# Patient Record
Sex: FEMALE | Race: WHITE | Employment: OTHER | URBAN - METROPOLITAN AREA
[De-identification: names, ages, dates, MRNs, and addresses within clinical notes are randomized per-mention and may not be internally consistent; named-entity substitution may affect disease eponyms.]

---

## 2017-06-13 ENCOUNTER — TELEPHONE (OUTPATIENT)
Dept: FAMILY MEDICINE | Facility: CLINIC | Age: 57
End: 2017-06-13

## 2017-06-13 NOTE — TELEPHONE ENCOUNTER
Dr. Solis please review [facility] Letter placed in basket.    Looks like you have not seen this patient for over 3 years!    Is this appropriate? Please advise    Diamond MOONEY RN, BSN, PHN  Gleason Flex RN

## 2017-06-13 NOTE — TELEPHONE ENCOUNTER
Reason for Call:  Form, our goal is to have forms completed with 72 hours, however, some forms may require a visit or additional information.    Type of letter, form or note:  medical    Who is the form from?: [facility] (if other please explain)    Where did the form come from: form was mailed in    What clinic location was the form placed at?: [facility]     Where the form was placed: [facility]    What number is listed as a contact on the form?:        Additional comments: Fax when comple to fax # [phone]    Call taken on 6/13/2017 at 11:03 AM by PETAR CHRISTINE

## 2017-09-30 ENCOUNTER — HEALTH MAINTENANCE LETTER (OUTPATIENT)
Age: 57
End: 2017-09-30

## 2018-01-11 ENCOUNTER — TRANSFERRED RECORDS (OUTPATIENT)
Dept: HEALTH INFORMATION MANAGEMENT | Facility: CLINIC | Age: 58
End: 2018-01-11

## 2018-02-28 ENCOUNTER — RADIANT APPOINTMENT (OUTPATIENT)
Dept: GENERAL RADIOLOGY | Facility: CLINIC | Age: 58
End: 2018-02-28
Attending: NURSE PRACTITIONER
Payer: COMMERCIAL

## 2018-02-28 ENCOUNTER — OFFICE VISIT (OUTPATIENT)
Dept: FAMILY MEDICINE | Facility: CLINIC | Age: 58
End: 2018-02-28
Payer: COMMERCIAL

## 2018-02-28 VITALS
RESPIRATION RATE: 14 BRPM | DIASTOLIC BLOOD PRESSURE: 80 MMHG | HEIGHT: 68 IN | HEART RATE: 70 BPM | SYSTOLIC BLOOD PRESSURE: 126 MMHG | WEIGHT: 179 LBS | BODY MASS INDEX: 27.13 KG/M2 | TEMPERATURE: 98.5 F

## 2018-02-28 DIAGNOSIS — M25.551 PAIN IN RIGHT HIP: ICD-10-CM

## 2018-02-28 DIAGNOSIS — M79.651 PAIN OF RIGHT THIGH: Primary | ICD-10-CM

## 2018-02-28 DIAGNOSIS — M79.651 PAIN OF RIGHT THIGH: ICD-10-CM

## 2018-02-28 PROCEDURE — 73552 X-RAY EXAM OF FEMUR 2/>: CPT | Mod: RT

## 2018-02-28 PROCEDURE — 72170 X-RAY EXAM OF PELVIS: CPT | Mod: FY

## 2018-02-28 PROCEDURE — 99213 OFFICE O/P EST LOW 20 MIN: CPT | Performed by: NURSE PRACTITIONER

## 2018-02-28 RX ORDER — CAPECITABINE 500 MG/1
TABLET, FILM COATED ORAL
COMMUNITY
Start: 2018-02-06 | End: 2018-10-26

## 2018-02-28 ASSESSMENT — ANXIETY QUESTIONNAIRES
2. NOT BEING ABLE TO STOP OR CONTROL WORRYING: NOT AT ALL
GAD7 TOTAL SCORE: 0
3. WORRYING TOO MUCH ABOUT DIFFERENT THINGS: NOT AT ALL
1. FEELING NERVOUS, ANXIOUS, OR ON EDGE: NOT AT ALL
6. BECOMING EASILY ANNOYED OR IRRITABLE: NOT AT ALL
7. FEELING AFRAID AS IF SOMETHING AWFUL MIGHT HAPPEN: NOT AT ALL
5. BEING SO RESTLESS THAT IT IS HARD TO SIT STILL: NOT AT ALL

## 2018-02-28 ASSESSMENT — PATIENT HEALTH QUESTIONNAIRE - PHQ9: 5. POOR APPETITE OR OVEREATING: NOT AT ALL

## 2018-02-28 NOTE — MR AVS SNAPSHOT
"              After Visit Summary   2/28/2018    Darien Rivera    MRN: [id number]           Patient Information     Date Of Birth          1960        Visit Information        Provider Department      2/28/2018 9:40 AM Beryl Cesar APRN CNP [facility]        Today's Diagnoses     Pain of right thigh    -  1    Pain in right hip           Follow-ups after your visit        Follow-up notes from your care team     Return if symptoms worsen or fail to improve.      Who to contact     If you have questions or need follow up information about today's clinic visit or your schedule please contact [facility] directly at [phone].  Normal or non-critical lab and imaging results will be communicated to you by [facility], letter or phone within 4 business days after the clinic has received the results. If you do not hear from us within 7 days, please contact the clinic through [facility] or phone. If you have a critical or abnormal lab result, we will notify you by phone as soon as possible.  Submit refill requests through [employer] or call your pharmacy and they will forward the refill request to us. Please allow 3 business days for your refill to be completed.          Additional Information About Your Visit        MyChart Information     [employer] gives you secure access to your electronic health record. If you see a primary care provider, you can also send messages to your care team and make appointments. If you have questions, please call your primary care clinic.  If you do not have a primary care provider, please call [phone] and they will assist you.        Care EveryWhere ID     This is your Care EveryWhere ID. This could be used by other organizations to access your [facility] medical records  [id number]        Your Vitals Were     Pulse Temperature Respirations Height Last Period BMI (Body Mass Index)    70 98.5  F (36.9  C) (Oral) 14 5' 8\" (1.727 m) 11/15/2012 27.22 " kg/m2       Blood Pressure from Last 3 Encounters:   02/28/18 126/80   04/18/16 131/81   09/09/15 141/80    Weight from Last 3 Encounters:   02/28/18 179 lb (81.2 kg)   04/18/16 174 lb (78.9 kg)   09/09/15 171 lb 12.8 oz (77.9 kg)               Primary Care Provider Office Phone # Fax #    Sharon Emilee Solis [phone]       [id number]  [address]   [facility] [address]        Equal Access to Services     [facility]: Hadii aad ku hadasho Soomaali, waaxda luqadaha, qaybta kaalmada adeegyada, verona machado . So [facility] [phone].    ATENCIÓN: Si habla español, tiene a argueta disposición servicios gratuitos de asistencia lingüística. [facility] [phone].    We comply with applicable federal civil rights laws and [place] laws. We do not discriminate on the basis of race, color, national origin, age, disability, sex, sexual orientation, or gender identity.            Thank you!     Thank you for choosing [facility]  for your care. Our goal is always to provide you with excellent care. Hearing back from our patients is one way we can continue to improve our services. Please take a few minutes to complete the written survey that you may receive in the mail after your visit with us. Thank you!             Your Updated Medication List - Protect others around you: Learn how to safely use, store and throw away your medicines at www.disposemymeds.org.          This list is accurate as of 2/28/18 11:00 AM.  Always use your most recent med list.                   Brand Name Dispense Instructions for use Diagnosis    calcium 600/vitamin D 600-400 MG-UNIT per tablet   Generic drug:  calcium-vitamin D      Take 1 tablet by mouth 2 times daily        capecitabine 500 MG tablet CHEMO    XELODA          LORazepam 0.5 MG tablet    ATIVAN    30 tablet    Take 1 tablet (0.5 mg) by mouth every 4 hours as needed (Anxiety, Nausea/Vomiting or Sleep)    Personal history of  breast cancer, Bony metastasis (H), Cancer of breast (H)       XGEVA SC

## 2018-02-28 NOTE — PROGRESS NOTES
"HPI    SUBJECTIVE:   Darien Rivera is a 57 year old female who presents to clinic today for the following health issues:      Musculoskeletal problem/pain      Duration: started about 5 days ago     Description  Location: right thigh \"bone pain\"     Intensity:  moderate    Accompanying signs and symptoms: none    History  Previous similar problem: no-history of bone cancer   Previous evaluation:  none    Precipitating or alleviating factors:  Trauma or overuse: no   Aggravating factors include: certain movements. \"rolling over in bed\"     Therapies tried and outcome: NSAID - advil    Has follow up appointment on 3/6 at [place] with oncology.    Pain in R upper leg, rated 8/10 that is worse with movement or rolling over in bed.  No back pain.           Problem list and histories reviewed & adjusted, as indicated.  Additional history: as documented    Current Outpatient Prescriptions   Medication Sig Dispense Refill     capecitabine (XELODA) 500 MG tablet CHEMO        calcium-vitamin D (CALCIUM 600/VITAMIN D) 600-400 MG-UNIT per tablet Take 1 tablet by mouth 2 times daily        LORazepam (ATIVAN) 0.5 MG tablet Take 1 tablet (0.5 mg) by mouth every 4 hours as needed (Anxiety, Nausea/Vomiting or Sleep) (Patient not taking: Reported on 2/28/2018) 30 tablet 2     Allergies   Allergen Reactions     Erythromycin Nausea and Cramps     Keflex [Cephalexin Monohydrate] Nausea and Itching     Tegaderm Transparent Dressing (Informational Only) Rash       Reviewed and updated as needed this visit by clinical staff  Tobacco  Allergies  Problems  Med Hx  Soc Hx      Reviewed and updated as needed this visit by Provider         ROS:  CONSTITUTIONAL:NEGATIVE  for chills and fever   INTEGUMENTARY/SKIN: POSITIVE for see HPI  MUSCULOSKELETAL: NEGATIVE for significant arthralgias or myalgia    OBJECTIVE:     /80 (BP Location: Right arm, Cuff Size: Adult Regular)  Pulse 70  Temp 98.5  F (36.9  C) (Oral)  Resp 14  Ht 5' 8\" " (1.727 m)  Wt 179 lb (81.2 kg)  LMP 11/15/2012  BMI 27.22 kg/m2  Body mass index is 27.22 kg/(m^2).  GENERAL: alert and no distress  MS: no spinal or paraspinal tenderness, R hip with FROM though notes pain with flexion and adduction, no tenderness over R leg or hip, normal strength  SKIN: warm and dry; no suspicious lesions or rashes  PSYCH: mentation appears normal, affect normal/bright    XR: XR PELVIS 1/2 VW 2/28/2018 10:43 AM     HISTORY: Pain.     COMPARISON: None.         IMPRESSION: No evidence of acute fracture or malalignment. There is  faint patchy sclerosis throughout the pelvis and visualized bilateral  proximal femurs.     IMANI SERVIN MD  XR FEMUR RT 2 VW 2/28/2018 10:36 AM     HISTORY: Pain.     COMPARISON: None.         IMPRESSION: No evidence of acute fracture or malalignment. No bony  abnormalities appreciated.     IMANI SERVIN MD    ASSESSMENT/PLAN:   1. Pain of right thigh  Hx of cancer with bone mets.  New pain in R upper leg.  Will follow up with oncology at appt next week.   - XR Femur Right 2 Views; Future  - XR Pelvis 1/2 Views; Future    2. Pain in right hip  Hx of cancer with bone mets.  New pain in R upper leg. Will follow up with oncology at appt next week.  - XR Pelvis 1/2 Views; Future        NATALIE [facility]      Physical Exam

## 2018-03-01 ASSESSMENT — PATIENT HEALTH QUESTIONNAIRE - PHQ9: SUM OF ALL RESPONSES TO PHQ QUESTIONS 1-9: 1

## 2018-03-01 ASSESSMENT — ANXIETY QUESTIONNAIRES: GAD7 TOTAL SCORE: 0

## 2018-03-06 ENCOUNTER — TRANSFERRED RECORDS (OUTPATIENT)
Dept: HEALTH INFORMATION MANAGEMENT | Facility: CLINIC | Age: 58
End: 2018-03-06

## 2018-05-02 ENCOUNTER — TELEPHONE (OUTPATIENT)
Dept: FAMILY MEDICINE | Facility: CLINIC | Age: 58
End: 2018-05-02

## 2018-05-02 NOTE — TELEPHONE ENCOUNTER
Panel Management Review      Patient has the following on her problem list: None      Composite cancer screening  Chart review shows that this patient is due/due soon for the following Pap Smear, Colonoscopy and Fecal Colorectal (FIT)  Summary:    Patient is due/failing the following:   COLONOSCOPY, FIT and PAP    Action needed:   Patient needs office visit for physical with pap and colonoscopy or FIT test.    Type of outreach:    Phone, left message for patient to call back.     Questions for provider review:    None                                                                                                                                    Monae Juarez MA     Chart routed to Care Team .

## 2018-05-02 NOTE — TELEPHONE ENCOUNTER
Pt called, she will schedule via [employer] or call back.     Tab Contreras   05/02/18 2:47 PM

## 2018-05-04 ENCOUNTER — TRANSFERRED RECORDS (OUTPATIENT)
Dept: HEALTH INFORMATION MANAGEMENT | Facility: CLINIC | Age: 58
End: 2018-05-04

## 2018-06-14 ENCOUNTER — OFFICE VISIT (OUTPATIENT)
Dept: FAMILY MEDICINE | Facility: CLINIC | Age: 58
End: 2018-06-14
Payer: COMMERCIAL

## 2018-06-14 VITALS
RESPIRATION RATE: 16 BRPM | TEMPERATURE: 97.5 F | HEART RATE: 66 BPM | HEIGHT: 68 IN | WEIGHT: 171.3 LBS | SYSTOLIC BLOOD PRESSURE: 118 MMHG | BODY MASS INDEX: 25.96 KG/M2 | DIASTOLIC BLOOD PRESSURE: 74 MMHG

## 2018-06-14 DIAGNOSIS — Z12.11 SCREEN FOR COLON CANCER: ICD-10-CM

## 2018-06-14 DIAGNOSIS — C79.52 SECONDARY MALIGNANT NEOPLASM OF BONE AND BONE MARROW (H): ICD-10-CM

## 2018-06-14 DIAGNOSIS — Z11.59 NEED FOR HEPATITIS C SCREENING TEST: ICD-10-CM

## 2018-06-14 DIAGNOSIS — E78.1 HYPERTRIGLYCERIDEMIA: ICD-10-CM

## 2018-06-14 DIAGNOSIS — Z11.4 SCREENING FOR HIV (HUMAN IMMUNODEFICIENCY VIRUS): ICD-10-CM

## 2018-06-14 DIAGNOSIS — C50.912 MALIGNANT NEOPLASM OF LEFT BREAST IN FEMALE, ESTROGEN RECEPTOR POSITIVE, UNSPECIFIED SITE OF BREAST (H): ICD-10-CM

## 2018-06-14 DIAGNOSIS — Z12.4 SCREENING FOR MALIGNANT NEOPLASM OF CERVIX: ICD-10-CM

## 2018-06-14 DIAGNOSIS — C79.51 SECONDARY MALIGNANT NEOPLASM OF BONE AND BONE MARROW (H): ICD-10-CM

## 2018-06-14 DIAGNOSIS — Z00.00 ROUTINE GENERAL MEDICAL EXAMINATION AT A HEALTH CARE FACILITY: Primary | ICD-10-CM

## 2018-06-14 DIAGNOSIS — Z13.6 CARDIOVASCULAR SCREENING; LDL GOAL LESS THAN 160: ICD-10-CM

## 2018-06-14 DIAGNOSIS — Z17.0 MALIGNANT NEOPLASM OF LEFT BREAST IN FEMALE, ESTROGEN RECEPTOR POSITIVE, UNSPECIFIED SITE OF BREAST (H): ICD-10-CM

## 2018-06-14 LAB
ALBUMIN SERPL-MCNC: 4 G/DL (ref 3.4–5)
ALP SERPL-CCNC: 175 U/L (ref 40–150)
ALT SERPL W P-5'-P-CCNC: 49 U/L (ref 0–50)
ANION GAP SERPL CALCULATED.3IONS-SCNC: 10 MMOL/L (ref 3–14)
AST SERPL W P-5'-P-CCNC: 62 U/L (ref 0–45)
BILIRUB SERPL-MCNC: 0.7 MG/DL (ref 0.2–1.3)
BUN SERPL-MCNC: 13 MG/DL (ref 7–30)
CALCIUM SERPL-MCNC: 9.6 MG/DL (ref 8.5–10.1)
CHLORIDE SERPL-SCNC: 103 MMOL/L (ref 94–109)
CHOLEST SERPL-MCNC: 178 MG/DL
CO2 SERPL-SCNC: 25 MMOL/L (ref 20–32)
CREAT SERPL-MCNC: 0.86 MG/DL (ref 0.52–1.04)
ERYTHROCYTE [DISTWIDTH] IN BLOOD BY AUTOMATED COUNT: 13.8 % (ref 10–15)
GFR SERPL CREATININE-BSD FRML MDRD: 68 ML/MIN/1.7M2
GLUCOSE SERPL-MCNC: 106 MG/DL (ref 70–99)
HCT VFR BLD AUTO: 41.2 % (ref 35–47)
HDLC SERPL-MCNC: 67 MG/DL
HGB BLD-MCNC: 13.9 G/DL (ref 11.7–15.7)
LDLC SERPL CALC-MCNC: 96 MG/DL
MCH RBC QN AUTO: 35.5 PG (ref 26.5–33)
MCHC RBC AUTO-ENTMCNC: 33.7 G/DL (ref 31.5–36.5)
MCV RBC AUTO: 105 FL (ref 78–100)
NONHDLC SERPL-MCNC: 111 MG/DL
PLATELET # BLD AUTO: 259 10E9/L (ref 150–450)
POTASSIUM SERPL-SCNC: 4.5 MMOL/L (ref 3.4–5.3)
PROT SERPL-MCNC: 7.8 G/DL (ref 6.8–8.8)
RBC # BLD AUTO: 3.92 10E12/L (ref 3.8–5.2)
SODIUM SERPL-SCNC: 138 MMOL/L (ref 133–144)
TRIGL SERPL-MCNC: 74 MG/DL
TSH SERPL DL<=0.005 MIU/L-ACNC: 1.27 MU/L (ref 0.4–4)
WBC # BLD AUTO: 8.1 10E9/L (ref 4–11)

## 2018-06-14 PROCEDURE — 86803 HEPATITIS C AB TEST: CPT | Performed by: FAMILY MEDICINE

## 2018-06-14 PROCEDURE — G0145 SCR C/V CYTO,THINLAYER,RESCR: HCPCS | Performed by: FAMILY MEDICINE

## 2018-06-14 PROCEDURE — G0476 HPV COMBO ASSAY CA SCREEN: HCPCS | Performed by: FAMILY MEDICINE

## 2018-06-14 PROCEDURE — 84443 ASSAY THYROID STIM HORMONE: CPT | Performed by: FAMILY MEDICINE

## 2018-06-14 PROCEDURE — 36415 COLL VENOUS BLD VENIPUNCTURE: CPT | Performed by: FAMILY MEDICINE

## 2018-06-14 PROCEDURE — 85027 COMPLETE CBC AUTOMATED: CPT | Performed by: FAMILY MEDICINE

## 2018-06-14 PROCEDURE — 80053 COMPREHEN METABOLIC PANEL: CPT | Performed by: FAMILY MEDICINE

## 2018-06-14 PROCEDURE — 99396 PREV VISIT EST AGE 40-64: CPT | Performed by: FAMILY MEDICINE

## 2018-06-14 PROCEDURE — 80061 LIPID PANEL: CPT | Performed by: FAMILY MEDICINE

## 2018-06-14 NOTE — PROGRESS NOTES
SUBJECTIVE:   CC: Darien Rivera is an 58 year old woman who presents for preventive health visit.     Physical   Annual:     Getting at least 3 servings of Calcium per day::  Yes    Bi-annual eye exam::  Yes    Dental care twice a year::  Yes    Sleep apnea or symptoms of sleep apnea::  None    Diet::  Regular (no restrictions)    Frequency of exercise::  2-3 days/week    Duration of exercise::  30-45 minutes    Taking medications regularly::  Yes    Additional concerns today::  No            Scans done at [place]        Bone cancer  Patient has not seen me since 2014. She has been following closely with oncology at [place]. Patient is currently on an oral chemotherapy and receives an injection once a month. She reports minimal problems with the chemotherapy. Not currently on narcotics. Notes that her oncologist requested a few labs to be performed today. She will not follow up with oncologist again until August 2018.     Diet and exercise  Not currently taking vitamin D supplements because she goes out in the sun often.     Breast cancer screening  Has a personal hx of breast cancer--positive estrogen receptor. No longer gets mammograms due to double mastectomy. Has implants.     Colon cancer screening  Last FIT test was 7/27/2016--negative.     Derm   Patient has a skin lesion on her right forearm that she wishes to have inspected. She has been picking at.     Menopausal  No longer having periods. She is sexually active. Denies any vaginal dryness or pain during intercourse.     Other problems to add on...  -Patient defers HIV testing and shingles vaccine    Today's PHQ-2 Score:   PHQ-2 ( 1999 Pfizer) 6/11/2018   Q1: Little interest or pleasure in doing things 0   Q2: Feeling down, depressed or hopeless 0   PHQ-2 Score 0   Q1: Little interest or pleasure in doing things Not at all   Q2: Feeling down, depressed or hopeless Not at all   PHQ-2 Score 0     Abuse: Current or Past(Physical, Sexual or Emotional)- No  Do you  feel safe in your environment - Yes    Social History   Substance Use Topics     Smoking status: Former Smoker     Packs/day: 1.00     Years: 26.00     Types: Cigarettes     Quit date: 1/1/2007     Smokeless tobacco: Never Used     Alcohol use Yes      Comment: twice weekly     Alcohol Use 6/11/2018   If you drink alcohol do you typically have greater than 3 drinks per day OR greater than 7 drinks per week? No   No flowsheet data found.    Reviewed orders with patient.  Reviewed health maintenance and updated orders accordingly - Yes  Labs reviewed in EPIC  BP Readings from Last 3 Encounters:   06/14/18 118/74   02/28/18 126/80   04/18/16 131/81    Wt Readings from Last 3 Encounters:   02/28/18 81.2 kg (179 lb)   04/18/16 78.9 kg (174 lb)   09/09/15 77.9 kg (171 lb 12.8 oz)         Patient over age 50, mutual decision to screen reflected in health maintenance.    Pertinent mammograms are reviewed under the imaging tab.    History of abnormal Pap smear:   NO - age 30- 65 PAP every 3 years recommended    Last 3 Pap and HPV Results:   PAP / HPV 7/24/2014 7/12/2011 8/31/2009   PAP NIL NIL NIL     Reviewed and updated as needed this visit by clinical staff  Tobacco  Allergies  Meds  Problems  Med Hx  Surg Hx  Fam Hx  Soc Hx          Reviewed and updated as needed this visit by Provider  Meds  Problems  Surg Hx        Review of Systems  CONSTITUTIONAL: NEGATIVE for fever, chills, change in weight  INTEGUMENTARY/SKIN: NEGATIVE for worrisome rashes, moles or lesions  EYES: NEGATIVE for vision changes or irritation  ENT: NEGATIVE for ear, mouth and throat problems  RESP: NEGATIVE for significant cough or SOB  BREAST: NEGATIVE for masses, tenderness or discharge  CV: NEGATIVE for chest pain, palpitations or peripheral edema  GI: NEGATIVE for nausea, abdominal pain, heartburn, or change in bowel habits  : NEGATIVE for unusual urinary or vaginal symptoms. No vaginal bleeding.  MUSCULOSKELETAL: NEGATIVE for  significant arthralgias or myalgia  NEURO: NEGATIVE for weakness, dizziness or paresthesias  PSYCHIATRIC: NEGATIVE for changes in mood or affect      This document serves as a record of the services and decisions personally performed and made by Sharon Solis MD. It was created on her behalf by Nicole East, a trained medical scribe. The creation of this document is based on the provider's statements to the medical scribe.  Nicole East June 14, 2018 8:40 AM     OBJECTIVE:   /74 (BP Location: Right arm, Patient Position: Chair, Cuff Size: Adult Regular)  Pulse 66  Temp 97.5  F (36.4  C) (Oral)  Resp 16  LMP 11/15/2012     Physical Exam  GENERAL APPEARANCE: healthy, alert and no distress  EYES: Eyes grossly normal to inspection, PERRL and conjunctivae and sclerae normal  HENT: ear canals and TM's normal, nose and mouth without ulcers or lesions, oropharynx clear and oral mucous membranes moist  NECK: no adenopathy, no asymmetry, masses, or scars and thyroid normal to palpation  RESP: lungs clear to auscultation - no rales, rhonchi or wheezes  BREAST: Double mastectomy with implants. Otherwise, normal without masses, tenderness or nipple discharge and no palpable axillary masses or adenopathy  CV: regular rate and rhythm, normal S1 S2, no S3 or S4, no murmur, click or rub, no peripheral edema and peripheral pulses strong  ABDOMEN: soft, nontender, no hepatosplenomegaly, no masses and bowel sounds normal   (female): normal female external genitalia, normal urethral meatus, vaginal mucosal atrophy noted, normal cervix, adnexae, and uterus without masses or abnormal discharge  MS: no musculoskeletal defects are noted and gait is age appropriate without ataxia  SKIN: skin lesion that maybe Precancerous AK on right forearm vs tiny scabbed area, otherwise no suspicious lesions or rashes  NEURO: Normal strength and tone, sensory exam grossly normal, mentation intact and speech normal  PSYCH: mentation  appears normal and affect normal/bright    ASSESSMENT/PLAN:   (Z00.00) Routine general medical examination at a health care facility  (primary encounter diagnosis)  Comment: Patient overall doing well. Normal physical exam. Fasting lab work up today. Follow up regarding skin lesion if not improving, stop picking at the area  Plan: Lipid panel reflex to direct LDL Fasting,         Comprehensive metabolic panel, TSH with free T4        reflex, CBC with platelets          (C50.912,  Z17.0) Malignant neoplasm of left breast in female, estrogen receptor positive, unspecified site of breast (H)  Comment: Stable--had a double mastectomy in , on going chemo    (Z12.4) Screening for malignant neoplasm of cervix  Comment: Pap smear performed today. 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 Continue following up with oncology as recommended. Will fax over lab results to oncologist     (Z11.59) Need for hepatitis C screening test  Plan: Hepatitis C Screen Reflex to HCV RNA Quant and         Genotype          (Z11.4) Screening for HIV (human immunodeficiency virus)  Comment: Patient deferred     (Z12.11) Screen for colon cancer  Comment: Ordered today  Plan: Fecal colorectal cancer screen FIT - Future         (S+30)          Follow up in 1 year for physical with routine labs    COUNSELING:  Reviewed preventive health counseling, as reflected in patient instructions       Regular exercise       Healthy diet/nutrition       Immunizations       Colon cancer screening       Consider Hep C screening for patients born between 1945 and 1965       HIV screeninx in teen years, 1x in adult years, and at intervals if high risk     reports that she quit smoking about 11 years ago. Her smoking  "use included Cigarettes. She has a 26.00 pack-year smoking history. She has never used smokeless tobacco.  Estimated body mass index is 27.22 kg/(m^2) as calculated from the following:    Height as of 2/28/18: 1.727 m (5' 8\").    Weight as of 2/28/18: 81.2 kg (179 lb).   Weight management plan: Discussed healthy diet and exercise guidelines and patient will follow up in 12 months in clinic to re-evaluate.    Counseling Resources:  ATP IV Guidelines  Pooled Cohorts Equation Calculator  Breast Cancer Risk Calculator  FRAX Risk Assessment  ICSI Preventive Guidelines  Dietary Guidelines for Americans, 2010  USDA's MyPlate  ASA Prophylaxis  Lung CA Screening    The information in this document, created by the medical scribe for me, accurately reflects the services I personally performed and the decisions made by me. I have reviewed and approved this document for accuracy prior to leaving the patient care area.  June 14, 2018 8:40 AM    Sharon Solis MD  [facility]  "

## 2018-06-14 NOTE — MR AVS SNAPSHOT
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   o Discuss preventive care.    o Review your medicines if your doctor has prescribed any.      Get a Pap test every three years (unless you have an abnormal result and your provider advises testing more often).    If you get Pap tests with HPV test, you only need to test every 5 years, unless you have an abnormal result.     You do not need a Pap test if your uterus was removed (hysterectomy) and you have not had cancer.    You should be tested each year for STDs (sexually transmitted diseases) if you're at risk.     Have a mammogram every 1 to 2 years.    Have a colonoscopy at age 50, or have a yearly FIT test (stool test). These exams screen for colon cancer.      Have a cholesterol test every 5 years, or more often if advised.    Have a diabetes test (fasting glucose) every three years. If you are at risk for diabetes, you should have this test more often.     If you are at risk for osteoporosis (brittle bone disease), think about having a bone density scan (DEXA).    Shots: Get a flu shot each year. Get a tetanus shot  every 10 years.    Nutrition:     Eat at least 5 servings of fruits and vegetables each day.    Eat whole-grain bread, whole-wheat pasta and brown rice instead of white grains and rice.    Talk to your provider about Calcium and Vitamin D.     Lifestyle    Exercise at least 150 minutes a week (30 minutes a day, 5 days a week). This will help you control your weight and prevent disease.    Limit alcohol to one drink per day.    No smoking.     Wear sunscreen to prevent skin cancer.     See your dentist every six months for an exam and cleaning.    See your eye doctor every 1 to 2 years.            Follow-ups after your visit        Follow-up notes from your care team     Return in about 1 year (around 6/14/2019) for wellness exam.      Future tests that were ordered for you today     Open Future Orders        Priority Expected Expires Ordered    Fecal colorectal cancer screen FIT - Future (S+30) Routine 7/5/2018 7/14/2018 6/14/2018            Who to contact     If you have questions or need follow up information about today's clinic visit or your schedule please contact [facility] directly at [phone].  Normal or non-critical lab and imaging results will be communicated to you by [employer] Grou[employer], letter or phone within 4 business days after the clinic has received the results. If you do not hear from us within 7 days, please contact the clinic through "[employer]"t or phone. If you have a critical or abnormal lab result, we will notify you by phone as soon as possible.  Submit refill requests through [employer] or call your pharmacy and they will forward the refill request to us. Please allow 3 business days for your refill to be completed.          Additional Information About Your Visit        CytRx Information     C[employer]Rx gives you secure access to your electronic health record. If you see a primary care provider, you can also send messages to your care team and make appointments. If you have questions,  "please call your primary care clinic.  If you do not have a primary care provider, please call [phone] and they will assist you.        Care EveryWhere ID     This is your Care EveryWhere ID. This could be used by other organizations to access your [facility] medical records  [id number]        Your Vitals Were     Pulse Temperature Respirations Height Last Period BMI (Body Mass Index)    66 97.5  F (36.4  C) (Oral) 16 5' 7.5\" (1.715 m) 11/15/2012 26.43 kg/m2       Blood Pressure from Last 3 Encounters:   06/14/18 118/74   02/28/18 126/80   04/18/16 131/81    Weight from Last 3 Encounters:   06/14/18 171 lb 4.8 oz (77.7 kg)   02/28/18 179 lb (81.2 kg)   04/18/16 174 lb (78.9 kg)              We Performed the Following     CBC with platelets     Comprehensive metabolic panel     Hepatitis C Screen Reflex to HCV RNA Quant and Genotype     HPV High Risk Types DNA Cervical     Lipid panel reflex to direct LDL Fasting     Pap imaged thin layer screen with HPV - recommended age 30 - 65 years (select HPV order below)     TSH with free T4 reflex        Primary Care Provider Office Phone # Fax #    Sharon Solis [phone]       [id number]   [facility] [address]        Equal Access to Services     TORIN KELSEY AH: Hadii aad ku hadasho Soomaali, waaxda luqadaha, qaybta kaalmada adeegyada, verona yu. So [facility] [phone].    ATENCIÓN: Si habla español, tiene a argueta disposición servicios gratuitos de asistencia lingüística. Llame al [phone].    We comply with applicable federal civil rights laws and [place] laws. We do not discriminate on the basis of race, color, national origin, age, disability, sex, sexual orientation, or gender identity.            Thank you!     Thank you for choosing [facility]  for your care. Our goal is always to provide you with excellent care. Hearing back from our patients is one way we can continue to " improve our services. Please take a few minutes to complete the written survey that you may receive in the mail after your visit with us. Thank you!             Your Updated Medication List - Protect others around you: Learn how to safely use, store and throw away your medicines at www.disposemymeds.org.          This list is accurate as of 6/14/18  8:56 AM.  Always use your most recent med list.                   Brand Name Dispense Instructions for use Diagnosis    capecitabine 500 MG tablet CHEMO    XELODA          LORazepam 0.5 MG tablet    ATIVAN    30 tablet    Take 1 tablet (0.5 mg) by mouth every 4 hours as needed (Anxiety, Nausea/Vomiting or Sleep)    Personal history of breast cancer, Bony metastasis (H), Cancer of breast (H)       XGEVA SC

## 2018-06-15 ENCOUNTER — TELEPHONE (OUTPATIENT)
Dept: NURSING | Facility: CLINIC | Age: 58
End: 2018-06-15

## 2018-06-15 ENCOUNTER — NURSE TRIAGE (OUTPATIENT)
Dept: NURSING | Facility: CLINIC | Age: 58
End: 2018-06-15

## 2018-06-15 LAB — HCV AB SERPL QL IA: NONREACTIVE

## 2018-06-15 NOTE — TELEPHONE ENCOUNTER
Patient is calling requesting that her liver function test results be released in [employer]. I do not have access to do that. Please call her with questions.  Thank you,  Micaela Peña RN-[facility]

## 2018-06-15 NOTE — TELEPHONE ENCOUNTER
Patient is calling requesting that her liver function test results be released in [employer]. I don't have access to do that so sent an high priority encounter to the clinic RN for them to do that for the patient.  Micaela Peña RN-[facility]

## 2018-06-15 NOTE — TELEPHONE ENCOUNTER
Patient has these tests done on a regular basis for her history of breast cancer.  Labs released.  Pat Guillen RN

## 2018-06-18 LAB
COPATH REPORT: NORMAL
PAP: NORMAL

## 2018-06-20 LAB
FINAL DIAGNOSIS: NORMAL
HPV HR 12 DNA CVX QL NAA+PROBE: NEGATIVE
HPV16 DNA SPEC QL NAA+PROBE: NEGATIVE
HPV18 DNA SPEC QL NAA+PROBE: NEGATIVE
SPECIMEN DESCRIPTION: NORMAL
SPECIMEN SOURCE CVX/VAG CYTO: NORMAL

## 2018-06-21 PROCEDURE — 82274 ASSAY TEST FOR BLOOD FECAL: CPT | Performed by: FAMILY MEDICINE

## 2018-06-22 DIAGNOSIS — Z12.11 SCREEN FOR COLON CANCER: ICD-10-CM

## 2018-06-24 LAB — HEMOCCULT STL QL IA: NEGATIVE

## 2018-07-03 ENCOUNTER — TELEPHONE (OUTPATIENT)
Dept: FAMILY MEDICINE | Facility: CLINIC | Age: 58
End: 2018-07-03

## 2018-07-03 DIAGNOSIS — C79.51 BONY METASTASIS: Primary | ICD-10-CM

## 2018-07-03 RX ORDER — OXYCODONE HYDROCHLORIDE 5 MG/1
5 TABLET ORAL EVERY 6 HOURS PRN
Qty: 8 TABLET | Refills: 0 | Status: SHIPPED | OUTPATIENT
Start: 2018-07-03

## 2018-07-03 NOTE — TELEPHONE ENCOUNTER
Pt has BCA and is going through treatment with [facility]     She is having increased shoulder pain.  She did call to [facility] and they will fed ex her pain med but with the holiday this will take a couple of days.     They suggest she reach out to PCP and see if 2 days of oxycodone 5 mg can be prescribed to cover her until RX arrives.     Please advise as PCP is out of clinic     Tracy Martinez RN

## 2018-10-26 ENCOUNTER — OFFICE VISIT (OUTPATIENT)
Dept: FAMILY MEDICINE | Facility: CLINIC | Age: 58
End: 2018-10-26
Payer: COMMERCIAL

## 2018-10-26 VITALS
WEIGHT: 151 LBS | DIASTOLIC BLOOD PRESSURE: 70 MMHG | BODY MASS INDEX: 23.3 KG/M2 | SYSTOLIC BLOOD PRESSURE: 106 MMHG | RESPIRATION RATE: 16 BRPM | TEMPERATURE: 97.8 F | HEART RATE: 84 BPM

## 2018-10-26 DIAGNOSIS — R30.0 DYSURIA: ICD-10-CM

## 2018-10-26 DIAGNOSIS — N30.00 ACUTE CYSTITIS WITHOUT HEMATURIA: Primary | ICD-10-CM

## 2018-10-26 LAB
ALBUMIN UR-MCNC: 30 MG/DL
APPEARANCE UR: CLEAR
BACTERIA #/AREA URNS HPF: ABNORMAL /HPF
BILIRUB UR QL STRIP: ABNORMAL
COLOR UR AUTO: YELLOW
GLUCOSE UR STRIP-MCNC: NEGATIVE MG/DL
HGB UR QL STRIP: NEGATIVE
HYALINE CASTS #/AREA URNS LPF: ABNORMAL /LPF
KETONES UR STRIP-MCNC: ABNORMAL MG/DL
LEUKOCYTE ESTERASE UR QL STRIP: ABNORMAL
MUCOUS THREADS #/AREA URNS LPF: PRESENT /LPF
NITRATE UR QL: NEGATIVE
NON-SQ EPI CELLS #/AREA URNS LPF: ABNORMAL /LPF
PH UR STRIP: 6 PH (ref 5–7)
RBC #/AREA URNS AUTO: ABNORMAL /HPF
SOURCE: ABNORMAL
SP GR UR STRIP: 1.01 (ref 1–1.03)
UROBILINOGEN UR STRIP-ACNC: 0.2 EU/DL (ref 0.2–1)
WBC #/AREA URNS AUTO: ABNORMAL /HPF

## 2018-10-26 PROCEDURE — 87086 URINE CULTURE/COLONY COUNT: CPT | Performed by: PHYSICIAN ASSISTANT

## 2018-10-26 PROCEDURE — 99214 OFFICE O/P EST MOD 30 MIN: CPT | Performed by: PHYSICIAN ASSISTANT

## 2018-10-26 PROCEDURE — 81001 URINALYSIS AUTO W/SCOPE: CPT | Performed by: PHYSICIAN ASSISTANT

## 2018-10-26 RX ORDER — LETROZOLE 2.5 MG/1
TABLET, FILM COATED ORAL
Refills: 3 | COMMUNITY
Start: 2018-10-24

## 2018-10-26 RX ORDER — CIPROFLOXACIN 500 MG/1
500 TABLET, FILM COATED ORAL 2 TIMES DAILY
Qty: 20 TABLET | Refills: 0 | Status: SHIPPED | OUTPATIENT
Start: 2018-10-26 | End: 2018-11-05

## 2018-10-26 RX ORDER — PROCHLORPERAZINE MALEATE 10 MG
TABLET ORAL
Refills: 3 | COMMUNITY
Start: 2018-10-21

## 2018-10-26 NOTE — PROGRESS NOTES
"  SUBJECTIVE:   Darien Rivera is a 58 year old female who presents to clinic today for the following health issues:      URINARY TRACT SYMPTOMS  Onset: 10/24/18    Description:   Painful urination (Dysuria): no   Blood in urine (Hematuria): no   Delay in urine (Hesitency): no     Intensity: mild    Progression of Symptoms:  same    Accompanying Signs & Symptoms:  Fever/chills: no   Flank pain YES  Nausea and vomiting: no   Any vaginal symptoms: none  Abdominal/Pelvic Pain: no     History:   History of frequent UTI's: no   History of kidney stones: no   Sexually Active: YES  Possibility of pregnancy: No    Precipitating factors:   Breast cancer, stage 4    Therapies Tried and outcome: none    Patient with metastatic (to liver and bones) breast cancer here with UTI concerns.  She has been doing a holistic treatment program in [facility] and while there recently they performed several labs.  Her UA revealed concerns of a possible UTI.  She is asymptomatic but here for retesting and possible treatment.  The test done noted elevated WBC's.  Additionally she has been recently seen at the [facility] for flank pain where a CT scan was done.  This showed large liver metastasis and bone lesions and a healing rib fracture.  Today she only notes fatigue.    The alternative treatments only involve several different vitamin and supplement type treatments.  \"[facility] clinic in [place].      Problem list and histories reviewed & adjusted, as indicated.  Additional history: as documented    Labs reviewed in EPIC    Reviewed and updated as needed this visit by clinical staff  Tobacco  Allergies  Meds  Med Hx  Surg Hx  Fam Hx  Soc Hx      Reviewed and updated as needed this visit by Provider         ROS:  Constitutional, HEENT, cardiovascular, pulmonary, gi and gu systems are negative, except as otherwise noted.    OBJECTIVE:     /70 (BP Location: Right arm, Patient Position: Sitting, Cuff Size: Adult Regular)  Pulse " 84  Temp 97.8  F (36.6  C) (Oral)  Resp 16  Wt 151 lb (68.5 kg)  LMP 11/15/2012  BMI 23.3 kg/m2  Body mass index is 23.3 kg/(m^2).  GENERAL: healthy, alert and no distress  ABDOMEN: soft, nontender, no hepatosplenomegaly, no masses and bowel sounds normal  MS: no gross musculoskeletal defects noted, no edema  SKIN: no suspicious lesions or rashes  BACK: no CVA tenderness, no paralumbar tenderness  PSYCH: mentation appears normal, affect normal/bright    Diagnostic Test Results:  Results for orders placed or performed in visit on 10/26/18   *UA reflex to Microscopic and Culture ([place] and [facility] (except [place] and [place])   Result Value Ref Range    Color Urine Yellow     Appearance Urine Clear     Glucose Urine Negative NEG^Negative mg/dL    Bilirubin Urine Small (A) NEG^Negative    Ketones Urine Trace (A) NEG^Negative mg/dL    Specific Gravity Urine 1.010 1.003 - 1.035    Blood Urine Negative NEG^Negative    pH Urine 6.0 5.0 - 7.0 pH    Protein Albumin Urine 30 (A) NEG^Negative mg/dL    Urobilinogen Urine 0.2 0.2 - 1.0 EU/dL    Nitrite Urine Negative NEG^Negative    Leukocyte Esterase Urine Moderate (A) NEG^Negative    Source Midstream Urine    Urine Microscopic   Result Value Ref Range    WBC Urine 5-10 (A) OTO5^0 - 5 /HPF    RBC Urine O - 2 OTO2^O - 2 /HPF    Hyaline Casts 5-10 (A) OTO2^O - 2 /LPF    Squamous Epithelial /LPF Urine Moderate (A) FEW^Few /LPF    Bacteria Urine Moderate (A) NEG^Negative /HPF    Mucous Urine Present (A) NEG^Negative /LPF   Urine Culture Aerobic Bacterial   Result Value Ref Range    Specimen Description Midstream Urine     Culture Micro <10,000 colonies/mL  urogenital aba          ASSESSMENT/PLAN:   1. Acute cystitis without hematuria  Will start on Cipro and await culture over the weekend.  If symptoms worsen she should proceed to ED or UC.  Push fluids.  - ciprofloxacin (CIPRO) 500 MG tablet; Take 1 tablet (500 mg) by mouth 2 times daily for 10 days  Dispense:  20 tablet; Refill: 0    2. Dysuria    - *UA reflex to Microscopic and Culture ([place] and [facility] (except [place] and [place])  - Urine Microscopic  - Urine Culture Aerobic Bacterial        Richmond Zeng PA-C  [facility]

## 2018-10-26 NOTE — MR AVS SNAPSHOT
After Visit Summary   10/26/2018    Darien Rivera    MRN: [id number]           Patient Information     Date Of Birth          1960        Visit Information        Provider Department      10/26/2018 11:00 AM Richmond Zeng PA-C [facility]        Today's Diagnoses     Acute cystitis without hematuria    -  1    Dysuria           Follow-ups after your visit        Follow-up notes from your care team     Return in about 1 week (around 11/2/2018) for if symptoms worsen or fail to improve.      Who to contact     If you have questions or need follow up information about today's clinic visit or your schedule please contact [facility] directly at [phone].  Normal or non-critical lab and imaging results will be communicated to you by MyChart, letter or phone within 4 business days after the clinic has received the results. If you do not hear from us within 7 days, please contact the clinic through e-Go aeroplaneshart or phone. If you have a critical or abnormal lab result, we will notify you by phone as soon as possible.  Submit refill requests through [employer] or call your pharmacy and they will forward the refill request to us. Please allow 3 business days for your refill to be completed.          Additional Information About Your Visit        MyChart Information     A[employer] gives you secure access to your electronic health record. If you see a primary care provider, you can also send messages to your care team and make appointments. If you have questions, please call your primary care clinic.  If you do not have a primary care provider, please call [phone] and they will assist you.        Care EveryWhere ID     This is your Care EveryWhere ID. This could be used by other organizations to access your [facility] medical records  [id number]        Your Vitals Were     Pulse Temperature Respirations Last Period BMI (Body Mass Index)       84 97.8  F (36.6  C) (Oral)  16 11/15/2012 23.3 kg/m2        Blood Pressure from Last 3 Encounters:   10/26/18 106/70   06/14/18 118/74   02/28/18 126/80    Weight from Last 3 Encounters:   10/26/18 151 lb (68.5 kg)   06/14/18 171 lb 4.8 oz (77.7 kg)   02/28/18 179 lb (81.2 kg)              We Performed the Following     *UA reflex to Microscopic and Culture ([place] and [facility] (except [place] and [place])     Urine Culture Aerobic Bacterial     Urine Microscopic          Today's Medication Changes          These changes are accurate as of 10/26/18 11:49 AM.  If you have any questions, ask your nurse or doctor.               Start taking these medicines.        Dose/Directions    ciprofloxacin 500 MG tablet   Commonly known as:  CIPRO   Used for:  Acute cystitis without hematuria   Started by:  Richmond Zeng PA-C        Dose:  500 mg   Take 1 tablet (500 mg) by mouth 2 times daily for 10 days   Quantity:  20 tablet   Refills:  0            Where to get your medicines      These medications were sent to [facility] #[id number] [place] [id number]     Phone:  [phone]     ciprofloxacin 500 MG tablet                Primary Care Provider Office Phone # Fax #    Sharon Solis [phone]  [address]   [facility] [address]        Equal Access to Services     TORIN KELSEY AH: Hadjoe parryo [place], waaxda luqadaha, qaybta kaalmada aquilinoyaarnel, verona yu. So [facility] [phone].    ATENCIÓN: Si habla español, tiene a argueta disposición servicios gratuitos de asistencia lingüística. Wes al [phone].    We comply with applicable federal civil rights laws and [place] laws. We do not discriminate on the basis of race, color, national origin, age, disability, sex, sexual orientation, or gender identity.            Thank you!     Thank you for choosing [facility]  for your care. Our goal  is always to provide you with excellent care. Hearing back from our patients is one way we can continue to improve our services. Please take a few minutes to complete the written survey that you may receive in the mail after your visit with us. Thank you!             Your Updated Medication List - Protect others around you: Learn how to safely use, store and throw away your medicines at www.disposemymeds.org.          This list is accurate as of 10/26/18 11:49 AM.  Always use your most recent med list.                   Brand Name Dispense Instructions for use Diagnosis    ciprofloxacin 500 MG tablet    CIPRO    20 tablet    Take 1 tablet (500 mg) by mouth 2 times daily for 10 days    Acute cystitis without hematuria       letrozole 2.5 MG tablet    FEMARA          LORazepam 0.5 MG tablet    ATIVAN    30 tablet    Take 1 tablet (0.5 mg) by mouth every 4 hours as needed (Anxiety, Nausea/Vomiting or Sleep)    Personal history of breast cancer, Bony metastasis (H), Cancer of breast (H)       oxyCODONE IR 5 MG tablet    ROXICODONE    8 tablet    Take 1 tablet (5 mg) by mouth every 6 hours as needed for severe pain    Bony metastasis (H)       prochlorperazine 10 MG tablet    COMPAZINE          XGEVA SC

## 2018-10-27 LAB
BACTERIA SPEC CULT: NORMAL
SPECIMEN SOURCE: NORMAL

## 2018-10-30 ENCOUNTER — TELEPHONE (OUTPATIENT)
Dept: FAMILY MEDICINE | Facility: CLINIC | Age: 58
End: 2018-10-30

## 2018-10-30 NOTE — TELEPHONE ENCOUNTER
Pt calling about lab results released to [facility]  She did start the abx  Wondering if based on results she should continue abx    Also has stage 4 breast cancer with mets to liver  Fatigue has improved, flank pain improved also    CB# [phone]    Please review labs    Catalina Willard RN, BS  Clinical Nurse Triage.

## 2018-10-30 NOTE — TELEPHONE ENCOUNTER
LM on pts cell to complete abx and call back if not 100% after completing course    Catalina Willard RN, BS  Clinical Nurse Triage.

## 2018-10-30 NOTE — TELEPHONE ENCOUNTER
Hi- I had been meaning to call her or have you call.  It is not actually showing a true UTI.  Her micro sure was abnormal though!  Perhaps it was all in the urethra.  Other option is vaginal irritation.  I am very happy to hear that she is feeling better.  At this point she has had 5 days of ABX.  Can she finish out to 7 please?  If her sx return or she is not completely better I would recommend she do a wet prep.  That can be lab only visit.  Let me know if she has questions.      Thanks!  Richmond

## 2018-12-04 ENCOUNTER — TELEPHONE (OUTPATIENT)
Dept: FAMILY MEDICINE | Facility: CLINIC | Age: 58
End: 2018-12-04

## 2018-12-04 DIAGNOSIS — R10.9 FLANK PAIN: ICD-10-CM

## 2018-12-04 DIAGNOSIS — R10.9 FLANK PAIN: Primary | ICD-10-CM

## 2018-12-04 LAB
ALBUMIN UR-MCNC: NEGATIVE MG/DL
APPEARANCE UR: CLEAR
BACTERIA #/AREA URNS HPF: ABNORMAL /HPF
BILIRUB UR QL STRIP: NEGATIVE
COLOR UR AUTO: YELLOW
GLUCOSE UR STRIP-MCNC: NEGATIVE MG/DL
HGB UR QL STRIP: NEGATIVE
KETONES UR STRIP-MCNC: NEGATIVE MG/DL
LEUKOCYTE ESTERASE UR QL STRIP: ABNORMAL
MUCOUS THREADS #/AREA URNS LPF: PRESENT /LPF
NITRATE UR QL: NEGATIVE
NON-SQ EPI CELLS #/AREA URNS LPF: ABNORMAL /LPF
PH UR STRIP: 6 PH (ref 5–7)
RBC #/AREA URNS AUTO: ABNORMAL /HPF
SOURCE: ABNORMAL
SP GR UR STRIP: 1.02 (ref 1–1.03)
SPECIMEN SOURCE: NORMAL
UROBILINOGEN UR STRIP-ACNC: 0.2 EU/DL (ref 0.2–1)
WBC #/AREA URNS AUTO: ABNORMAL /HPF
WET PREP SPEC: NORMAL

## 2018-12-04 PROCEDURE — 87210 SMEAR WET MOUNT SALINE/INK: CPT | Performed by: PHYSICIAN ASSISTANT

## 2018-12-04 PROCEDURE — 81001 URINALYSIS AUTO W/SCOPE: CPT | Performed by: PHYSICIAN ASSISTANT

## 2018-12-04 PROCEDURE — 87086 URINE CULTURE/COLONY COUNT: CPT | Performed by: FAMILY MEDICINE

## 2018-12-04 NOTE — TELEPHONE ENCOUNTER
Patient calling requesting a recheck on her UA done a month ago.  She states that she is feeling better but due to her situation, she wanted to be sure that she does not have an infection.    See phone message from 10/30/2018.    Appointment scheduled for lab only UA and wet prep today.  Advised lab results would be relayed to her.  Pat Guillen RN

## 2018-12-05 LAB
BACTERIA SPEC CULT: NORMAL
SPECIMEN SOURCE: NORMAL

## 2018-12-17 ENCOUNTER — TELEPHONE (OUTPATIENT)
Dept: FAMILY MEDICINE | Facility: CLINIC | Age: 58
End: 2018-12-17

## 2018-12-17 NOTE — TELEPHONE ENCOUNTER
Written by Sharon Solis MD on 12/6/2018 10:12 AM   Odilon Ledezma   The urine is not growing out a true infection. If you are still having an irritation, pain or concerns, I highly recommend an appointment to check an exam, wet prep(vaginal swab).   If you would like to come in for the vaginal swab, self done, which is a lab only appointment, I can order this for you. If this is negative, then an in person appointment recommended.     I hope this finds you well.     Please call the clinic for more information if you have any questions.   [facility], MD      Pt called in requesting results, message given, pt not symptomatic    Catalina Willard RN, BS  Clinical Nurse Triage.

## 2019-01-01 ENCOUNTER — TRANSFERRED RECORDS (OUTPATIENT)
Dept: HEALTH INFORMATION MANAGEMENT | Facility: CLINIC | Age: 59
End: 2019-01-01

## 2019-01-01 ENCOUNTER — ANCILLARY PROCEDURE (OUTPATIENT)
Dept: GENERAL RADIOLOGY | Facility: CLINIC | Age: 59
End: 2019-01-01
Attending: PHYSICIAN ASSISTANT
Payer: MEDICARE

## 2019-01-01 ENCOUNTER — TELEPHONE (OUTPATIENT)
Dept: FAMILY MEDICINE | Facility: CLINIC | Age: 59
End: 2019-01-01

## 2019-01-01 ENCOUNTER — MEDICAL CORRESPONDENCE (OUTPATIENT)
Dept: HEALTH INFORMATION MANAGEMENT | Facility: CLINIC | Age: 59
End: 2019-01-01

## 2019-01-01 ENCOUNTER — OFFICE VISIT (OUTPATIENT)
Dept: URGENT CARE | Facility: URGENT CARE | Age: 59
End: 2019-01-01
Payer: MEDICARE

## 2019-01-01 ENCOUNTER — HEALTH MAINTENANCE LETTER (OUTPATIENT)
Age: 59
End: 2019-01-01

## 2019-01-01 ENCOUNTER — OFFICE VISIT (OUTPATIENT)
Dept: FAMILY MEDICINE | Facility: CLINIC | Age: 59
End: 2019-01-01
Payer: MEDICARE

## 2019-01-01 VITALS
BODY MASS INDEX: 23.54 KG/M2 | RESPIRATION RATE: 16 BRPM | HEIGHT: 67 IN | HEART RATE: 86 BPM | SYSTOLIC BLOOD PRESSURE: 138 MMHG | DIASTOLIC BLOOD PRESSURE: 72 MMHG | WEIGHT: 150 LBS | OXYGEN SATURATION: 98 % | TEMPERATURE: 97.6 F

## 2019-01-01 VITALS
DIASTOLIC BLOOD PRESSURE: 66 MMHG | SYSTOLIC BLOOD PRESSURE: 118 MMHG | BODY MASS INDEX: 23.54 KG/M2 | WEIGHT: 150 LBS | HEART RATE: 70 BPM | OXYGEN SATURATION: 97 % | HEIGHT: 67 IN | TEMPERATURE: 99.3 F

## 2019-01-01 DIAGNOSIS — C50.919 BREAST CA (H): ICD-10-CM

## 2019-01-01 DIAGNOSIS — Z12.11 SPECIAL SCREENING FOR MALIGNANT NEOPLASMS, COLON: ICD-10-CM

## 2019-01-01 DIAGNOSIS — E83.52 HYPERCALCEMIA: ICD-10-CM

## 2019-01-01 DIAGNOSIS — C79.52 SECONDARY MALIGNANT NEOPLASM OF BONE AND BONE MARROW (H): ICD-10-CM

## 2019-01-01 DIAGNOSIS — C50.919 BREAST CA (H): Primary | ICD-10-CM

## 2019-01-01 DIAGNOSIS — R53.83 FATIGUE, UNSPECIFIED TYPE: ICD-10-CM

## 2019-01-01 DIAGNOSIS — C79.51 SECONDARY MALIGNANT NEOPLASM OF BONE AND BONE MARROW (H): ICD-10-CM

## 2019-01-01 DIAGNOSIS — Z12.11 SPECIAL SCREENING FOR MALIGNANT NEOPLASMS, COLON: Primary | ICD-10-CM

## 2019-01-01 DIAGNOSIS — M54.9 UPPER BACK PAIN: Primary | ICD-10-CM

## 2019-01-01 DIAGNOSIS — R07.89 CHEST WALL PAIN: ICD-10-CM

## 2019-01-01 DIAGNOSIS — M54.9 UPPER BACK PAIN: ICD-10-CM

## 2019-01-01 LAB
ALBUMIN UR-MCNC: NEGATIVE MG/DL
ALT SERPL-CCNC: 26 U/L (ref 7–45)
ALT SERPL-CCNC: 29 U/L (ref 7–45)
ANION GAP SERPL CALCULATED.3IONS-SCNC: 12 MMOL/L (ref 3–14)
APPEARANCE UR: CLEAR
AST SERPL-CCNC: 59 U/L (ref 8–43)
AST SERPL-CCNC: 71 U/L (ref 8–43)
BILIRUB UR QL STRIP: NEGATIVE
BUN SERPL-MCNC: 23 MG/DL (ref 7–30)
CALCIUM SERPL-MCNC: 12.1 MG/DL (ref 8.5–10.1)
CALCIUM SERPL-MCNC: 9.2 MG/DL (ref 8.5–10.1)
CHLORIDE SERPL-SCNC: 101 MMOL/L (ref 94–109)
CO2 SERPL-SCNC: 25 MMOL/L (ref 20–32)
COLOR UR AUTO: YELLOW
CREAT SERPL-MCNC: 0.92 MG/DL (ref 0.52–1.04)
CREAT SERPL-MCNC: 1.05 MG/DL (ref 0.52–1.04)
CREAT SERPL-MCNC: 1.28 MG/DL (ref 0.59–1.04)
CREAT SERPL-MCNC: 1.69 MG/DL (ref 0.59–1.04)
ERYTHROCYTE [DISTWIDTH] IN BLOOD BY AUTOMATED COUNT: 14.6 % (ref 10–15)
GFR SERPL CREATININE-BSD FRML MDRD: 33 ML/MIN/BSA
GFR SERPL CREATININE-BSD FRML MDRD: 47 ML/MIN/BSA
GFR SERPL CREATININE-BSD FRML MDRD: 59 ML/MIN/{1.73_M2}
GFR SERPL CREATININE-BSD FRML MDRD: 69 ML/MIN/{1.73_M2}
GLUCOSE SERPL-MCNC: 69 MG/DL (ref 70–99)
GLUCOSE SERPL-MCNC: 87 MG/DL (ref 70–140)
GLUCOSE SERPL-MCNC: 96 MG/DL (ref 70–140)
GLUCOSE UR STRIP-MCNC: NEGATIVE MG/DL
HCT VFR BLD AUTO: 35 % (ref 35–47)
HEMOCCULT STL QL IA: NEGATIVE
HGB BLD-MCNC: 11.2 G/DL (ref 11.7–15.7)
HGB UR QL STRIP: NEGATIVE
KETONES UR STRIP-MCNC: NEGATIVE MG/DL
LEUKOCYTE ESTERASE UR QL STRIP: NEGATIVE
MCH RBC QN AUTO: 31.5 PG (ref 26.5–33)
MCHC RBC AUTO-ENTMCNC: 32 G/DL (ref 31.5–36.5)
MCV RBC AUTO: 99 FL (ref 78–100)
NITRATE UR QL: NEGATIVE
PH UR STRIP: 7 PH (ref 5–7)
PLATELET # BLD AUTO: 225 10E9/L (ref 150–450)
POTASSIUM SERPL-SCNC: 4 MMOL/L (ref 3.6–5.2)
POTASSIUM SERPL-SCNC: 4.3 MMOL/L (ref 3.6–5.2)
POTASSIUM SERPL-SCNC: 4.5 MMOL/L (ref 3.4–5.3)
PTH-INTACT SERPL-MCNC: 11 PG/ML (ref 18–80)
RBC # BLD AUTO: 3.55 10E12/L (ref 3.8–5.2)
SODIUM SERPL-SCNC: 138 MMOL/L (ref 133–144)
SOURCE: NORMAL
SP GR UR STRIP: 1.01 (ref 1–1.03)
UROBILINOGEN UR STRIP-ACNC: 0.2 EU/DL (ref 0.2–1)
WBC # BLD AUTO: 10 10E9/L (ref 4–11)

## 2019-01-01 PROCEDURE — 81003 URINALYSIS AUTO W/O SCOPE: CPT | Performed by: NURSE PRACTITIONER

## 2019-01-01 PROCEDURE — 36415 COLL VENOUS BLD VENIPUNCTURE: CPT | Performed by: NURSE PRACTITIONER

## 2019-01-01 PROCEDURE — 82565 ASSAY OF CREATININE: CPT | Performed by: INTERNAL MEDICINE

## 2019-01-01 PROCEDURE — 83970 ASSAY OF PARATHORMONE: CPT | Performed by: NURSE PRACTITIONER

## 2019-01-01 PROCEDURE — 85027 COMPLETE CBC AUTOMATED: CPT | Performed by: NURSE PRACTITIONER

## 2019-01-01 PROCEDURE — 82310 ASSAY OF CALCIUM: CPT | Performed by: INTERNAL MEDICINE

## 2019-01-01 PROCEDURE — 82274 ASSAY TEST FOR BLOOD FECAL: CPT | Performed by: NURSE PRACTITIONER

## 2019-01-01 PROCEDURE — 36415 COLL VENOUS BLD VENIPUNCTURE: CPT | Performed by: INTERNAL MEDICINE

## 2019-01-01 PROCEDURE — 80048 BASIC METABOLIC PNL TOTAL CA: CPT | Performed by: NURSE PRACTITIONER

## 2019-01-01 PROCEDURE — 72072 X-RAY EXAM THORAC SPINE 3VWS: CPT

## 2019-01-01 PROCEDURE — 99214 OFFICE O/P EST MOD 30 MIN: CPT | Performed by: PHYSICIAN ASSISTANT

## 2019-01-01 PROCEDURE — 99214 OFFICE O/P EST MOD 30 MIN: CPT | Performed by: NURSE PRACTITIONER

## 2019-01-01 RX ORDER — NAPROXEN 500 MG/1
500 TABLET ORAL 2 TIMES DAILY WITH MEALS
Qty: 30 TABLET | Refills: 0 | Status: SHIPPED | OUTPATIENT
Start: 2019-01-01

## 2019-01-01 ASSESSMENT — ENCOUNTER SYMPTOMS
BACK PAIN: 1
FEVER: 0
CHILLS: 0

## 2019-01-01 ASSESSMENT — MIFFLIN-ST. JEOR
SCORE: 1298.03
SCORE: 1298.03

## 2019-07-09 NOTE — PROGRESS NOTES
"Subjective     Darien Rivera is a 59 year old female who presents to clinic today for the following health issues:    HPI   Concern - Lump behind Ear  Onset: Ongoing since Nov of 2018    Description:   Feels as if there is a \"bb\" underneath left ear.    Intensity: No pain    Progression of Symptoms:  same    Accompanying Signs & Symptoms:  None    Previous history of similar problem:   Hx of Breast Cancer. Would like mass checked out.    Precipitating factors:   Worsened by: N/a    Alleviating factors:  Improved by: N/a    Therapies Tried and outcome: None    Lump might have gotten slightly larger, but not significant.  No pain.      Also having right upper chest/rib pain. Hx of double mastectomy s/p breast cancer. Pain radiates around to the back.   Pain is worse with movement or lying on her back.    No cough, SOB.  Hx of breast, liver and bone cancer.  Hasn't seen oncology in several months.   Spent the winter in [place].    Stopped doing chemo last fall and took a holistic approach to treatments and had been feeling really good.    She has also noticed feeling more fatigued lately.    In the past has not had urinary symptoms and has had a UTI.  Would like a urine test today.           Current Outpatient Medications   Medication Sig Dispense Refill     naproxen (NAPROSYN) 500 MG tablet Take 1 tablet (500 mg) by mouth 2 times daily (with meals) 30 tablet 0     Denosumab (XGEVA SC)        letrozole (FEMARA) 2.5 MG tablet   3     LORazepam (ATIVAN) 0.5 MG tablet Take 1 tablet (0.5 mg) by mouth every 4 hours as needed (Anxiety, Nausea/Vomiting or Sleep) (Patient not taking: Reported on 7/9/2019) 30 tablet 2     oxyCODONE IR (ROXICODONE) 5 MG tablet Take 1 tablet (5 mg) by mouth every 6 hours as needed for severe pain (Patient not taking: Reported on 10/26/2018) 8 tablet 0     prochlorperazine (COMPAZINE) 10 MG tablet   3     Allergies   Allergen Reactions     Erythromycin Nausea and Cramps     Keflex [Cephalexin " "Monohydrate] Nausea and Itching     Sulfa Drugs      Does not want to take during cancer treatments       Tegaderm Transparent Dressing (Informational Only) Rash       Reviewed and updated as needed this visit by Provider  Meds         Review of Systems   ROS COMP: Constitutional, HEENT, cardiovascular, pulmonary, gi and gu systems are negative, except as otherwise noted.      Objective    /72   Pulse 86   Temp 97.6  F (36.4  C) (Oral)   Resp 16   Ht 1.702 m (5' 7\")   Wt 68 kg (150 lb)   LMP 11/15/2012   SpO2 98%   BMI 23.49 kg/m    Body mass index is 23.49 kg/m .  Physical Exam   GENERAL: healthy, alert and no distress  HENT: ear canals and TM's normal, nose and mouth without ulcers or lesions  NECK: no adenopathy, no asymmetry, masses, or scars and thyroid normal to palpation  RESP: lungs clear to auscultation - no rales, rhonchi or wheezes  CV: regular rate and rhythm, normal S1 S2, no S3 or S4, no murmur, click or rub  MS: no gross musculoskeletal defects noted, no edema, R lateral chest wall tenderness, chest wall pain increases with R arm flexion     Diagnostic Test Results:  Labs reviewed in Epic        Assessment & Plan     1. Special screening for malignant neoplasms, colon  Due; agrees today.   - Fecal colorectal cancer screen (FIT); Future    2. Fatigue, unspecified type  Hx of of UTI despite being asymptomatic.  Labs today.   - *UA reflex to Microscopic and Culture ([place] and [facility] (except [place] and [place])  - Basic metabolic panel  - CBC with platelets    3. Chest wall pain  Pain reproducible on exam, likely musculoskeletal.  She is requesting narcotic pain medication.  She has been prescribed this in the past by her oncologist.  She has not followed up with them in several months.  I have advised her to follow up with her specialist regarding increasing pain and to discuss pain medication.  She verbalizes understanding.    - naproxen (NAPROSYN) 500 MG tablet; Take 1 " tablet (500 mg) by mouth 2 times daily (with meals)  Dispense: 30 tablet; Refill: 0       Return in about 3 months (around 10/9/2019) for Physical Exam.    NATALIE Camp [facility]

## 2019-07-15 NOTE — TELEPHONE ENCOUNTER
The Pt called back. I did relay below information to her, and I scheduled her for a lab only visit for tomorrow morning.     Elise Carcamo RN -- [facility]

## 2019-07-15 NOTE — TELEPHONE ENCOUNTER
Called the Pt to relay below lab result, no answer, LM. Waiting callback.     Elise Carcamo, RN -- [facility]       Beryl Cesar APRN CNP  P  Triage Pool             Please call patient.  I have sent lab results and she has not viewed these.  Mild anemia; not too concerning but should watch this.  Calcium is elevated.  I would like her to come in to have her parathyroid hormone checked and have put in lab orders.  Rest of metabolic panel is normal.     Beryl Cesar CNP

## 2019-07-19 NOTE — TELEPHONE ENCOUNTER
The Pt called back. I did relay the below result note to her.     The Pt wanted RN to talk with Beryl about prescribing Narcotic pain medication. I did huddle with Beryl who did advise following up with Oncology with everything that is going on.     Elise Carcamo RN -- [facility]

## 2019-07-19 NOTE — TELEPHONE ENCOUNTER
Notes recorded by Beryl Cesar APRN CNP on 7/19/2019 at 8:17 AM CDT  Please call Darien.  Calcium is high (new for her) and PTH is low.  This is likely related to her cancer and she needs to follow up with her oncologist.  Please advise her to make oncology appt.     Beryl Cesar CNP    LM to call back     Catalina Willard RN, BS  Clinical Nurse Triage.

## 2019-08-23 NOTE — PROGRESS NOTES
SUBJECTIVE:   Darien Rivera is a 59 year old female presenting with a chief complaint of   Chief Complaint   Patient presents with     Back Pain     upper back pain /feels hot, was hard to lift her arm  x 4 days, looked down and had some pain --  pt has breast cancer,would like and xray too        She is an established patient of [facility].    Back Pain    Onset of symptoms was 4 day(s) ago.  Location: upper back  Radiation: none  Context: Patient with history of breast cancer with bone metastasis. She denies any injury or trauma. Notes she was looking down on her phone and when she got up, she felt as if there was a bone that slipped in her upper back. She notes pain with bending, twisting motion. Denies any numbness or tingling right upper extremities.  Course of symptoms is same.    Severity moderate  Current and Associated symptoms: pain  Denies: fecal incontinence, urinary incontinence, lower extremity numbness, lower extremity weakness and paresthesia    Aggravating Factors: bending and twisting  Therapies to improve symptoms include: Tylenol and rest  Past history: chronic low back pain      Review of Systems   Constitutional: Negative for chills and fever.   Musculoskeletal: Positive for back pain (upper back pain).       Past Medical History:   Diagnosis Date     Bone cancer (H)      Bony metastasis (H) 5/15/2014     Breast cancer (H)      Chronic low back pain 2010     Degeneration of lumbar or lumbosacral intervertebral disc      Esophageal reflux      HTN (hypertension)      HTN, goal below 140/90      Lung cancer (H)      Malignant neoplasm of breast (female), unspecified site     left breast     NONSPECIFIC MEDICAL HISTORY     , no complications     Pleural effusion 2012     Family History   Problem Relation Age of Onset     Diabetes Sister         Type 1     Breast Cancer Other         great grandmother     Alcohol/Drug Father         ETOH     Respiratory Father         COPD,  "emphysema, smoker     Alzheimer Disease Mother         no official dx., suspect at age 69     Cardiovascular Mother      Heart Disease Mother         A fib     Osteoporosis Mother      Diabetes Mother      Blood Disease Maternal Grandfather         leukemia at age 70     Congenital Anomalies Other         neice born without digits     Respiratory Paternal Grandfather         COPD, emphysema     Cancer Sister         lung, smoker, passed away     Diabetes Sister      Breast Cancer Other      Current Outpatient Medications   Medication Sig Dispense Refill     Denosumab (XGEVA SC)        letrozole (FEMARA) 2.5 MG tablet   3     LORazepam (ATIVAN) 0.5 MG tablet Take 1 tablet (0.5 mg) by mouth every 4 hours as needed (Anxiety, Nausea/Vomiting or Sleep) 30 tablet 2     oxyCODONE IR (ROXICODONE) 5 MG tablet Take 1 tablet (5 mg) by mouth every 6 hours as needed for severe pain 8 tablet 0     naproxen (NAPROSYN) 500 MG tablet Take 1 tablet (500 mg) by mouth 2 times daily (with meals) (Patient not taking: Reported on 2019) 30 tablet 0     prochlorperazine (COMPAZINE) 10 MG tablet   3     Social History     Tobacco Use     Smoking status: Former Smoker     Packs/day: 1.00     Years: 26.00     Pack years: 26.00     Types: Cigarettes     Last attempt to quit: 2007     Years since quittin.6     Smokeless tobacco: Never Used   Substance Use Topics     Alcohol use: Yes     Comment: twice weekly       OBJECTIVE  /66 (BP Location: Right arm, Patient Position: Chair, Cuff Size: Adult Regular)   Pulse 70   Temp 99.3  F (37.4  C) (Oral)   Ht 1.702 m (5' 7\")   Wt 68 kg (150 lb)   LMP 11/15/2012   SpO2 97%   BMI 23.49 kg/m      Physical Exam   Constitutional: She appears well-developed and well-nourished. No distress.   HENT:   Head: Normocephalic.   Eyes: Conjunctivae are normal.   Neck: Normal range of motion.   Pulmonary/Chest: Effort normal. No respiratory distress.   Musculoskeletal: She exhibits " tenderness.   Tenderness to palpation upper thoracic spine. No gross deformities, swelling or ecchymosis noted.    Neurological: She is alert.   Skin: Skin is warm.   Psychiatric: She has a normal mood and affect.       Labs:  No results found for this or any previous visit (from the past 24 hour(s)).    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 There  is multilevel mild-to-moderate degenerative disc disease in the  midthoracic spine. The visualized lung fields are normal.                                                                      IMPRESSION: No acute abnormality     PIERRE DOSHI MD      ASSESSMENT:      ICD-10-CM    1. Upper back pain M54.9 XR Thoracic Spine 3 Views   2. 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 There is multi-level mild to moderate degenerative disc disease in the mid thoracic spine. Suspect pain from degenerative changes and neoplasm of bone. Advised prescription pain medication as needed. Patient has at home. No strenuous activities. May use heating pad over affected area as needed for pain. Follow up with oncology and/or orthopedics as needed. Patient agrees.     Followup:    If not improving or if condition worsens, follow up with your Primary Care Provider

## 2020-01-01 ENCOUNTER — TRANSFERRED RECORDS (OUTPATIENT)
Dept: HEALTH INFORMATION MANAGEMENT | Facility: CLINIC | Age: 60
End: 2020-01-01

## 2020-01-01 ENCOUNTER — HEALTH MAINTENANCE LETTER (OUTPATIENT)
Age: 60
End: 2020-01-01

## 2021-05-24 ENCOUNTER — RECORDS - HEALTHEAST (OUTPATIENT)
Dept: ADMINISTRATIVE | Facility: CLINIC | Age: 61
End: 2021-05-24

## 2021-05-25 ENCOUNTER — RECORDS - HEALTHEAST (OUTPATIENT)
Dept: ADMINISTRATIVE | Facility: CLINIC | Age: 61
End: 2021-05-25

## 2021-05-26 ENCOUNTER — RECORDS - HEALTHEAST (OUTPATIENT)
Dept: ADMINISTRATIVE | Facility: CLINIC | Age: 61
End: 2021-05-26

## 2021-05-27 ENCOUNTER — RECORDS - HEALTHEAST (OUTPATIENT)
Dept: ADMINISTRATIVE | Facility: CLINIC | Age: 61
End: 2021-05-27

## 2021-05-28 ENCOUNTER — RECORDS - HEALTHEAST (OUTPATIENT)
Dept: ADMINISTRATIVE | Facility: CLINIC | Age: 61
End: 2021-05-28

## 2021-05-29 ENCOUNTER — RECORDS - HEALTHEAST (OUTPATIENT)
Dept: ADMINISTRATIVE | Facility: CLINIC | Age: 61
End: 2021-05-29